# Patient Record
Sex: MALE | Race: WHITE | Employment: FULL TIME | URBAN - METROPOLITAN AREA
[De-identification: names, ages, dates, MRNs, and addresses within clinical notes are randomized per-mention and may not be internally consistent; named-entity substitution may affect disease eponyms.]

---

## 2024-05-03 ENCOUNTER — APPOINTMENT (OUTPATIENT)
Dept: GENERAL RADIOLOGY | Age: 24
DRG: 312 | End: 2024-05-03
Payer: COMMERCIAL

## 2024-05-03 ENCOUNTER — APPOINTMENT (OUTPATIENT)
Dept: CT IMAGING | Age: 24
DRG: 312 | End: 2024-05-03
Payer: COMMERCIAL

## 2024-05-03 ENCOUNTER — HOSPITAL ENCOUNTER (EMERGENCY)
Age: 24
Discharge: LEFT BEFORE DISCHARGE | DRG: 312 | End: 2024-05-04
Attending: EMERGENCY MEDICINE
Payer: COMMERCIAL

## 2024-05-03 VITALS
OXYGEN SATURATION: 100 % | SYSTOLIC BLOOD PRESSURE: 125 MMHG | DIASTOLIC BLOOD PRESSURE: 88 MMHG | BODY MASS INDEX: 27.3 KG/M2 | TEMPERATURE: 98.3 F | HEART RATE: 73 BPM | HEIGHT: 71 IN | WEIGHT: 195 LBS | RESPIRATION RATE: 23 BRPM

## 2024-05-03 DIAGNOSIS — R55 SYNCOPE AND COLLAPSE: Primary | ICD-10-CM

## 2024-05-03 DIAGNOSIS — I30.8 OTHER ACUTE PERICARDITIS: ICD-10-CM

## 2024-05-03 DIAGNOSIS — E87.6 HYPOKALEMIA: ICD-10-CM

## 2024-05-03 LAB
ANION GAP SERPL CALCULATED.3IONS-SCNC: 13 MMOL/L (ref 9–16)
BASOPHILS # BLD: 0.03 K/UL (ref 0–0.2)
BASOPHILS NFR BLD: 1 % (ref 0–2)
BNP SERPL-MCNC: <36 PG/ML (ref 0–300)
BUN SERPL-MCNC: 12 MG/DL (ref 6–20)
CALCIUM SERPL-MCNC: 8.1 MG/DL (ref 8.6–10.4)
CHLORIDE SERPL-SCNC: 104 MMOL/L (ref 98–107)
CO2 SERPL-SCNC: 22 MMOL/L (ref 20–31)
CREAT SERPL-MCNC: 1.3 MG/DL (ref 0.7–1.2)
CRP SERPL HS-MCNC: <3 MG/L (ref 0–5)
EOSINOPHIL # BLD: 0.19 K/UL (ref 0–0.44)
EOSINOPHILS RELATIVE PERCENT: 4 % (ref 1–4)
ERYTHROCYTE [DISTWIDTH] IN BLOOD BY AUTOMATED COUNT: 12.5 % (ref 11.8–14.4)
GFR, ESTIMATED: 79 ML/MIN/1.73M2
GLUCOSE SERPL-MCNC: 72 MG/DL (ref 74–99)
HCT VFR BLD AUTO: 40.1 % (ref 40.7–50.3)
HGB BLD-MCNC: 12.7 G/DL (ref 13–17)
IMM GRANULOCYTES # BLD AUTO: <0.03 K/UL (ref 0–0.3)
IMM GRANULOCYTES NFR BLD: 0 %
LYMPHOCYTES NFR BLD: 3.37 K/UL (ref 1.1–3.7)
LYMPHOCYTES RELATIVE PERCENT: 61 % (ref 24–43)
MAGNESIUM SERPL-MCNC: 2.1 MG/DL (ref 1.6–2.6)
MCH RBC QN AUTO: 27.6 PG (ref 25.2–33.5)
MCHC RBC AUTO-ENTMCNC: 31.7 G/DL (ref 28.4–34.8)
MCV RBC AUTO: 87.2 FL (ref 82.6–102.9)
MONOCYTES NFR BLD: 0.32 K/UL (ref 0.1–1.2)
MONOCYTES NFR BLD: 6 % (ref 3–12)
NEUTROPHILS NFR BLD: 28 % (ref 36–65)
NEUTS SEG NFR BLD: 1.53 K/UL (ref 1.5–8.1)
NRBC BLD-RTO: 0 PER 100 WBC
PLATELET # BLD AUTO: 211 K/UL (ref 138–453)
PMV BLD AUTO: 10.1 FL (ref 8.1–13.5)
POTASSIUM SERPL-SCNC: 3.5 MMOL/L (ref 3.7–5.3)
RBC # BLD AUTO: 4.6 M/UL (ref 4.21–5.77)
SODIUM SERPL-SCNC: 139 MMOL/L (ref 136–145)
TROPONIN I SERPL HS-MCNC: 20 NG/L (ref 0–22)
TROPONIN I SERPL HS-MCNC: 7 NG/L (ref 0–22)
WBC OTHER # BLD: 5.5 K/UL (ref 3.5–11.3)

## 2024-05-03 PROCEDURE — 85025 COMPLETE CBC W/AUTO DIFF WBC: CPT

## 2024-05-03 PROCEDURE — 93005 ELECTROCARDIOGRAM TRACING: CPT

## 2024-05-03 PROCEDURE — 70450 CT HEAD/BRAIN W/O DYE: CPT

## 2024-05-03 PROCEDURE — 85652 RBC SED RATE AUTOMATED: CPT

## 2024-05-03 PROCEDURE — 84484 ASSAY OF TROPONIN QUANT: CPT

## 2024-05-03 PROCEDURE — 83735 ASSAY OF MAGNESIUM: CPT

## 2024-05-03 PROCEDURE — 86140 C-REACTIVE PROTEIN: CPT

## 2024-05-03 PROCEDURE — 2580000003 HC RX 258

## 2024-05-03 PROCEDURE — 71046 X-RAY EXAM CHEST 2 VIEWS: CPT

## 2024-05-03 PROCEDURE — 72125 CT NECK SPINE W/O DYE: CPT

## 2024-05-03 PROCEDURE — 80048 BASIC METABOLIC PNL TOTAL CA: CPT

## 2024-05-03 PROCEDURE — 83880 ASSAY OF NATRIURETIC PEPTIDE: CPT

## 2024-05-03 PROCEDURE — 6370000000 HC RX 637 (ALT 250 FOR IP)

## 2024-05-03 RX ORDER — 0.9 % SODIUM CHLORIDE 0.9 %
1000 INTRAVENOUS SOLUTION INTRAVENOUS ONCE
Status: COMPLETED | OUTPATIENT
Start: 2024-05-03 | End: 2024-05-03

## 2024-05-03 RX ORDER — POTASSIUM CHLORIDE 20 MEQ/1
40 TABLET, EXTENDED RELEASE ORAL ONCE
Status: COMPLETED | OUTPATIENT
Start: 2024-05-03 | End: 2024-05-03

## 2024-05-03 RX ADMIN — SODIUM CHLORIDE 1000 ML: 9 INJECTION, SOLUTION INTRAVENOUS at 22:33

## 2024-05-03 RX ADMIN — POTASSIUM CHLORIDE 40 MEQ: 1500 TABLET, EXTENDED RELEASE ORAL at 22:32

## 2024-05-03 ASSESSMENT — LIFESTYLE VARIABLES
HOW OFTEN DO YOU HAVE A DRINK CONTAINING ALCOHOL: MONTHLY OR LESS
HOW MANY STANDARD DRINKS CONTAINING ALCOHOL DO YOU HAVE ON A TYPICAL DAY: 1 OR 2

## 2024-05-03 ASSESSMENT — PAIN - FUNCTIONAL ASSESSMENT: PAIN_FUNCTIONAL_ASSESSMENT: NONE - DENIES PAIN

## 2024-05-04 ENCOUNTER — APPOINTMENT (OUTPATIENT)
Dept: GENERAL RADIOLOGY | Age: 24
DRG: 312 | End: 2024-05-04
Payer: COMMERCIAL

## 2024-05-04 ENCOUNTER — HOSPITAL ENCOUNTER (INPATIENT)
Age: 24
LOS: 1 days | Discharge: HOME | DRG: 312 | End: 2024-05-05
Attending: EMERGENCY MEDICINE | Admitting: STUDENT IN AN ORGANIZED HEALTH CARE EDUCATION/TRAINING PROGRAM
Payer: COMMERCIAL

## 2024-05-04 ENCOUNTER — APPOINTMENT (OUTPATIENT)
Dept: CT IMAGING | Age: 24
DRG: 312 | End: 2024-05-04
Payer: COMMERCIAL

## 2024-05-04 DIAGNOSIS — R55 SYNCOPE AND COLLAPSE: Primary | ICD-10-CM

## 2024-05-04 DIAGNOSIS — R55 SYNCOPE, UNSPECIFIED SYNCOPE TYPE: ICD-10-CM

## 2024-05-04 LAB
ANION GAP SERPL CALCULATED.3IONS-SCNC: 13 MMOL/L (ref 9–16)
BASOPHILS # BLD: 0.03 K/UL (ref 0–0.2)
BASOPHILS NFR BLD: 1 % (ref 0–2)
BUN SERPL-MCNC: 12 MG/DL (ref 6–20)
CALCIUM SERPL-MCNC: 8.4 MG/DL (ref 8.6–10.4)
CHLORIDE SERPL-SCNC: 105 MMOL/L (ref 98–107)
CO2 SERPL-SCNC: 22 MMOL/L (ref 20–31)
CREAT SERPL-MCNC: 1.4 MG/DL (ref 0.7–1.2)
EKG ATRIAL RATE: 71 BPM
EKG P AXIS: 43 DEGREES
EKG P-R INTERVAL: 148 MS
EKG Q-T INTERVAL: 400 MS
EKG QRS DURATION: 92 MS
EKG QTC CALCULATION (BAZETT): 434 MS
EKG R AXIS: 54 DEGREES
EKG T AXIS: 42 DEGREES
EKG VENTRICULAR RATE: 71 BPM
EOSINOPHIL # BLD: 0.17 K/UL (ref 0–0.44)
EOSINOPHILS RELATIVE PERCENT: 4 % (ref 1–4)
ERYTHROCYTE [DISTWIDTH] IN BLOOD BY AUTOMATED COUNT: 12.5 % (ref 11.8–14.4)
ERYTHROCYTE [SEDIMENTATION RATE] IN BLOOD BY PHOTOMETRIC METHOD: 1 MM/HR (ref 0–15)
GFR, ESTIMATED: 71 ML/MIN/1.73M2
GLUCOSE SERPL-MCNC: 80 MG/DL (ref 74–99)
HGB BLD-MCNC: 12.8 G/DL (ref 13–17)
IMM GRANULOCYTES # BLD AUTO: <0.03 K/UL (ref 0–0.3)
IMM GRANULOCYTES NFR BLD: 0 %
LYMPHOCYTES NFR BLD: 2.52 K/UL (ref 1.1–3.7)
LYMPHOCYTES RELATIVE PERCENT: 62 % (ref 24–43)
MCH RBC QN AUTO: 26.8 PG (ref 25.2–33.5)
MCHC RBC AUTO-ENTMCNC: 30.9 G/DL (ref 28.4–34.8)
MCV RBC AUTO: 86.8 FL (ref 82.6–102.9)
MONOCYTES NFR BLD: 0.2 K/UL (ref 0.1–1.2)
MONOCYTES NFR BLD: 5 % (ref 3–12)
NEUTROPHILS NFR BLD: 28 % (ref 36–65)
NEUTS SEG NFR BLD: 1.13 K/UL (ref 1.5–8.1)
NRBC BLD-RTO: 0 PER 100 WBC
PLATELET # BLD AUTO: 231 K/UL (ref 138–453)
PMV BLD AUTO: 10.2 FL (ref 8.1–13.5)
POTASSIUM SERPL-SCNC: 4.1 MMOL/L (ref 3.7–5.3)
RBC # BLD AUTO: 4.77 M/UL (ref 4.21–5.77)
SODIUM SERPL-SCNC: 140 MMOL/L (ref 136–145)
TROPONIN I SERPL HS-MCNC: 10 NG/L (ref 0–22)
TROPONIN I SERPL HS-MCNC: 7 NG/L (ref 0–22)
WBC OTHER # BLD: 4.1 K/UL (ref 3.5–11.3)

## 2024-05-04 PROCEDURE — 85025 COMPLETE CBC W/AUTO DIFF WBC: CPT

## 2024-05-04 PROCEDURE — 72125 CT NECK SPINE W/O DYE: CPT

## 2024-05-04 PROCEDURE — 1200000000 HC SEMI PRIVATE

## 2024-05-04 PROCEDURE — 80048 BASIC METABOLIC PNL TOTAL CA: CPT

## 2024-05-04 PROCEDURE — 93010 ELECTROCARDIOGRAM REPORT: CPT | Performed by: INTERNAL MEDICINE

## 2024-05-04 PROCEDURE — 6370000000 HC RX 637 (ALT 250 FOR IP)

## 2024-05-04 PROCEDURE — 84484 ASSAY OF TROPONIN QUANT: CPT

## 2024-05-04 PROCEDURE — 71046 X-RAY EXAM CHEST 2 VIEWS: CPT

## 2024-05-04 PROCEDURE — 83880 ASSAY OF NATRIURETIC PEPTIDE: CPT

## 2024-05-04 PROCEDURE — 93005 ELECTROCARDIOGRAM TRACING: CPT | Performed by: STUDENT IN AN ORGANIZED HEALTH CARE EDUCATION/TRAINING PROGRAM

## 2024-05-04 PROCEDURE — 2580000003 HC RX 258

## 2024-05-04 PROCEDURE — 99285 EMERGENCY DEPT VISIT HI MDM: CPT

## 2024-05-04 PROCEDURE — 70450 CT HEAD/BRAIN W/O DYE: CPT

## 2024-05-04 RX ORDER — IBUPROFEN 400 MG/1
600 TABLET ORAL EVERY 8 HOURS PRN
Qty: 32 TABLET | Refills: 0 | Status: SHIPPED | OUTPATIENT
Start: 2024-05-04 | End: 2024-05-11

## 2024-05-04 RX ORDER — SODIUM CHLORIDE, SODIUM LACTATE, POTASSIUM CHLORIDE, AND CALCIUM CHLORIDE .6; .31; .03; .02 G/100ML; G/100ML; G/100ML; G/100ML
1000 INJECTION, SOLUTION INTRAVENOUS ONCE
Status: COMPLETED | OUTPATIENT
Start: 2024-05-04 | End: 2024-05-05

## 2024-05-04 RX ORDER — IBUPROFEN 400 MG/1
600 TABLET ORAL ONCE
Status: COMPLETED | OUTPATIENT
Start: 2024-05-04 | End: 2024-05-04

## 2024-05-04 RX ADMIN — IBUPROFEN 600 MG: 400 TABLET, FILM COATED ORAL at 01:01

## 2024-05-04 RX ADMIN — SODIUM CHLORIDE, POTASSIUM CHLORIDE, SODIUM LACTATE AND CALCIUM CHLORIDE 1000 ML: 600; 310; 30; 20 INJECTION, SOLUTION INTRAVENOUS at 23:46

## 2024-05-04 ASSESSMENT — PAIN SCALES - GENERAL: PAINLEVEL_OUTOF10: 1

## 2024-05-04 ASSESSMENT — PAIN - FUNCTIONAL ASSESSMENT: PAIN_FUNCTIONAL_ASSESSMENT: NONE - DENIES PAIN

## 2024-05-04 ASSESSMENT — LIFESTYLE VARIABLES
HOW OFTEN DO YOU HAVE A DRINK CONTAINING ALCOHOL: 2-4 TIMES A MONTH
HOW MANY STANDARD DRINKS CONTAINING ALCOHOL DO YOU HAVE ON A TYPICAL DAY: 3 OR 4

## 2024-05-04 NOTE — ED NOTES
Patient to ED via LS1 due to a syncopal episode happened 30 minutes PTA. Patient states he had the same episode when he was 14 years old. Denies any type of medical history. A&OX4, GCS 15 during assessment. Denies Chest pain or SOB at this time. IV established by LS1. EKG done. Patient is resting comfortably, NAD, VSS, Call light in reach. Plan of care on going.

## 2024-05-04 NOTE — ED NOTES
Orthostatic Vital signs:    Lying down:   Blood Pressure: 131/96mmHg (MAP: 106)  HR: 76 bpm  RR: 16 bpm  O2 saturation: 100% Room air    Sitting:  Blood Pressure: 150/103 mmHg MAP: 118)  HR: 74 bpm  RR: 17 bpm  O2 saturation: 100% Room air    Standing:  Blood Pressure 154/101 mmHg (MAP: 106)  HR: 68 bpm  RR: 18 bpm  O2 saturation: 99% Room air

## 2024-05-04 NOTE — ED PROVIDER NOTES
[facility] ED  Emergency Department Encounter  Emergency Medicine Resident     Pt Name:Eleuterio Duarte  MRN: [id number]  Birthdate 2000  Date of evaluation: 5/4/24  PCP:  No primary care provider on file.  Note Started: 2:54 AM EDT      CHIEF COMPLAINT       Chief Complaint   Patient presents with    Loss of Consciousness     Denies hitting the head but complaining of neck pain       HISTORY OF PRESENT ILLNESS  (Location/Symptom, Timing/Onset, Context/Setting, Quality, Duration, Modifying Factors, Severity.)      Eleuterio Duarte is a 23 y.o. male who presents with complaints of episode of loss of consciousness when he was at work after suddenly getting up and walking to back of the store.  Notes he had lightheadedness followed by loss consciousness.  Unsure if he hit his head.  Was not told he had any seizure-like activity no prior history of seizures.  Denies any regular alcohol but does note regular marijuana use.  Patient notes he had neck pain prior to fall that he woke up with in the morning after heavy workout.  Denies sudden onset neck pain yesterday.  No vision changes, numbness, weakness, tingling.  No chest pain or shortness of breath.  No recent cough, congestion, viral symptoms or fever.  Denies family history of sudden cardiac death at young age.  On arrival, patient in c-collar was placed by paramedics.  States he feels back to baseline.  Notes he has not been drinking much water over the last 24 hours.  Has had 1 episode of syncope in past when he was dehydrated as a teenager.  No regular medications.    PAST MEDICAL / SURGICAL / SOCIAL / FAMILY HISTORY      has no past medical history on file.  Reviewed with patient     has no past surgical history on file.  Reviewed with patient    Social History     Socioeconomic History    Marital status: Single     Spouse name: Not on file    Number of children: Not on file    Years of education: Not on file    Highest education level:

## 2024-05-04 NOTE — DISCHARGE INSTRUCTIONS
Thank you for visiting [facility] Emergency Department.    You need to call [facility] to make an appointment as directed for follow up.    Should you have any questions regarding your care or further treatment, please call [facility] Emergency Department at [phone].    You are leaving against medical advice. It was recommended that you were admitted today for further workup of possibly inflammation of the sac surrounding your heart.  You are at risk for possible abnormal heart rhythms that could kill you.  You should return to emergency department if you change your mind at any time about wanting further workup or management.  You should follow-up with soon as possible with cardiology as well as your family physician or [facility].  Drink plenty fluids and take your time when changing position.

## 2024-05-04 NOTE — ED PROVIDER NOTES
I performed a history and physical examination of the patient and discussed management with the resident. I reviewed the resident’s note and agree with the documented findings and plan of care. Any areas of disagreement are noted on the chart. I was personally present for the key portions of any procedures. I have documented in the chart those procedures where I was not present during the key portions. Unless noted in my documentation, I agree with the chief complaint, past medical history, past surgical history, allergies, medications, social and family history as documented. Documentation of the HPI, Physical Exam and Medical Decision Making performed by medical students or scribes is based on my personal performance of the HPI, PE and MDM.   For Phys Assistant/ Nurse Practitioner cases/documentation I have personally evaluated this patient and have completed at least one if not all key elements of the E/M (history, physical exam, and MDM). I find the patient's history and physical exam are consistent with the NP/PA documentation. I agree with the care provided, treatment rendered, disposition and followup plan.   Additional findings are as noted.    Mohamud Guzman MD  Attending Emergency  Physician        EKG Interpretation    Interpreted by me    Rhythm: normal sinus   Rate: 71  Axis: normal  Ectopy: none  Conduction: normal  ST Segments: ST elevations noted in leads II, aVF, V2, V3, V4, V5, V6.  T Waves: no acute change  Q Waves: none    Clinical Impression: Diffuse ST elevations and no previous tracings available for comparison.  Concern for possible pericarditis.        Mohamud Guzman MD  05/03/24 2101      This patient presented to the emergency department after having sustained an apparent syncopal episode tonight while at work.  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  He denies

## 2024-05-04 NOTE — ED PROVIDER NOTES
[facility] ED  Emergency Department  Faculty Sign-Out Addendum     Care of Eleuterio Duarte was assumed from previous attending and is being seen for Loss of Consciousness (Denies hitting the head but complaining of neck pain)  .  The patient's initial evaluation and plan have been discussed with the prior provider who initially evaluated the patient.      I reviewed the resident’s note and agree with the documented findings and plan of care. Any areas of disagreement are noted on the chart. I was personally present for the key portions of any procedures. I have documented in the chart those procedures where I was not present during the key portions. I have reviewed the emergency nurses triage note. I agree with the chief complaint, past medical history, past surgical history, allergies, medications, social and family history as documented unless otherwise noted below.      EMERGENCY DEPARTMENT COURSE / MEDICAL DECISION MAKING:       MEDICATIONS GIVEN:  Orders Placed This Encounter   Medications    sodium chloride 0.9 % bolus 1,000 mL    potassium chloride (KLOR-CON M) extended release tablet 40 mEq    ibuprofen (IBU) 400 MG tablet     Sig: Take 1.5 tablets by mouth every 8 hours as needed for Pain     Dispense:  32 tablet     Refill:  0    ibuprofen (ADVIL;MOTRIN) tablet 600 mg       LABS / RADIOLOGY:     Labs Reviewed   CBC WITH AUTO DIFFERENTIAL - Abnormal; Notable for the following components:       Result Value    Hemoglobin 12.7 (*)     Hematocrit 40.1 (*)     Neutrophils % 28 (*)     Lymphocytes % 61 (*)     All other components within normal limits   BASIC METABOLIC PANEL W/ REFLEX TO MG FOR LOW K - Abnormal; Notable for the following components:    Potassium 3.5 (*)     Glucose 72 (*)     Creatinine 1.3 (*)     Calcium 8.1 (*)     All other components within normal limits   TROPONIN   TROPONIN   BRAIN NATRIURETIC PEPTIDE   MAGNESIUM   C-REACTIVE PROTEIN   SEDIMENTATION RATE       XR  CHEST (2 VW)    Result Date: 5/3/2024  1. No acute intracranial abnormality. 2. No evidence of fracture or dislocation in the cervical spine. 3. Minimal degenerative disc disease at C5-C6. 4. No acute cardiopulmonary process.     CT HEAD WO CONTRAST    Result Date: 5/3/2024  1. No acute intracranial abnormality. 2. No evidence of fracture or dislocation in the cervical spine. 3. Minimal degenerative disc disease at C5-C6. 4. No acute cardiopulmonary process.     CT CERVICAL SPINE WO CONTRAST    Result Date: 5/3/2024  1. No acute intracranial abnormality. 2. No evidence of fracture or dislocation in the cervical spine. 3. Minimal degenerative disc disease at C5-C6. 4. No acute cardiopulmonary process.       RECENT VITALS:     Temp: 98.3 °F (36.8 °C),  Pulse: 73, Respirations: 23, BP: 125/88, SpO2: 100 %    This patient is a 23 y.o. Male with syncope.  EKG showed diffuse ST elevation concerning for pericarditis.  He has not had any fevers and did not have any friction rub on exam.  CT head and neck were unremarkable.  CRP and ESR are negative.  Troponins were 20 and then 7.   The rest of the ER workup was unremarkable.  Cardiology was consulted.    OUTSTANDING TASKS / RECOMMENDATIONS:    Cardiology recommends the patient be admitted for further workup and echo.  Patient refuses admission.  The patient will leave AGAINST MEDICAL ADVICE.  He was instructed to start NSAIDs and to have prompt follow-up with cardiology outpatient and to return to the ER for worsening symptoms or if he changes his mind about admission.      Anna Martinez DO  Attending Emergency Physician  [facility] ED        Anna Martinez DO  05/04/24 0113

## 2024-05-05 VITALS
HEART RATE: 62 BPM | DIASTOLIC BLOOD PRESSURE: 83 MMHG | RESPIRATION RATE: 18 BRPM | TEMPERATURE: 98 F | OXYGEN SATURATION: 100 % | SYSTOLIC BLOOD PRESSURE: 120 MMHG | HEIGHT: 71 IN | WEIGHT: 202.82 LBS | BODY MASS INDEX: 28.4 KG/M2

## 2024-05-05 PROBLEM — R79.89 ELEVATED TROPONIN: Status: ACTIVE | Noted: 2024-05-05

## 2024-05-05 PROBLEM — R94.31 ST ELEVATION: Status: ACTIVE | Noted: 2024-05-05

## 2024-05-05 PROBLEM — N17.9 AKI (ACUTE KIDNEY INJURY) (HCC): Status: ACTIVE | Noted: 2024-05-05

## 2024-05-05 PROBLEM — D64.9 ANEMIA: Status: ACTIVE | Noted: 2024-05-05

## 2024-05-05 LAB
AMPHET UR QL SCN: NEGATIVE
ANION GAP SERPL CALCULATED.3IONS-SCNC: 12 MMOL/L (ref 9–16)
BARBITURATES UR QL SCN: NEGATIVE
BASOPHILS # BLD: 0.03 K/UL (ref 0–0.2)
BASOPHILS NFR BLD: 1 % (ref 0–2)
BENZODIAZ UR QL: NEGATIVE
BILIRUB UR QL STRIP: NEGATIVE
BNP SERPL-MCNC: 45 PG/ML (ref 0–300)
BUN SERPL-MCNC: 11 MG/DL (ref 6–20)
CALCIUM SERPL-MCNC: 8.1 MG/DL (ref 8.6–10.4)
CANNABINOIDS UR QL SCN: POSITIVE
CHLORIDE SERPL-SCNC: 109 MMOL/L (ref 98–107)
CLARITY UR: CLEAR
CO2 SERPL-SCNC: 16 MMOL/L (ref 20–31)
COCAINE UR QL SCN: NEGATIVE
COLOR UR: YELLOW
COMMENT: NORMAL
CREAT SERPL-MCNC: 1.2 MG/DL (ref 0.7–1.2)
CREAT UR-MCNC: 212 MG/DL (ref 39–259)
EOSINOPHIL # BLD: 0.27 K/UL (ref 0–0.44)
EOSINOPHILS RELATIVE PERCENT: 6 % (ref 1–4)
ERYTHROCYTE [DISTWIDTH] IN BLOOD BY AUTOMATED COUNT: 12.5 % (ref 11.8–14.4)
FENTANYL UR QL: NEGATIVE
GFR, ESTIMATED: 89 ML/MIN/1.73M2
GLUCOSE SERPL-MCNC: 69 MG/DL (ref 74–99)
GLUCOSE UR STRIP-MCNC: NEGATIVE MG/DL
HCT VFR BLD AUTO: 38.5 % (ref 40.7–50.3)
HGB BLD-MCNC: 11.6 G/DL (ref 13–17)
HGB UR QL STRIP.AUTO: NEGATIVE
IMM GRANULOCYTES # BLD AUTO: <0.03 K/UL (ref 0–0.3)
IMM GRANULOCYTES NFR BLD: 0 %
KETONES UR STRIP-MCNC: NEGATIVE MG/DL
LEUKOCYTE ESTERASE UR QL STRIP: NEGATIVE
LYMPHOCYTES NFR BLD: 2.6 K/UL (ref 1.1–3.7)
LYMPHOCYTES RELATIVE PERCENT: 56 % (ref 24–43)
MAGNESIUM SERPL-MCNC: 2 MG/DL (ref 1.6–2.6)
MCH RBC QN AUTO: 26.9 PG (ref 25.2–33.5)
MCHC RBC AUTO-ENTMCNC: 30.1 G/DL (ref 28.4–34.8)
MCV RBC AUTO: 89.3 FL (ref 82.6–102.9)
METHADONE UR QL: NEGATIVE
MONOCYTES NFR BLD: 0.39 K/UL (ref 0.1–1.2)
MONOCYTES NFR BLD: 9 % (ref 3–12)
NEUTROPHILS NFR BLD: 28 % (ref 36–65)
NEUTS SEG NFR BLD: 1.26 K/UL (ref 1.5–8.1)
NITRITE UR QL STRIP: NEGATIVE
NRBC BLD-RTO: 0 PER 100 WBC
OPIATES UR QL SCN: NEGATIVE
OXYCODONE UR QL SCN: NEGATIVE
PCP UR QL SCN: NEGATIVE
PH UR STRIP: 5.5 [PH] (ref 5–8)
PLATELET # BLD AUTO: 187 K/UL (ref 138–453)
PMV BLD AUTO: 10 FL (ref 8.1–13.5)
POTASSIUM SERPL-SCNC: 4.7 MMOL/L (ref 3.7–5.3)
PROT UR STRIP-MCNC: NEGATIVE MG/DL
RBC # BLD AUTO: 4.31 M/UL (ref 4.21–5.77)
SODIUM SERPL-SCNC: 137 MMOL/L (ref 136–145)
SODIUM UR-SCNC: 204 MMOL/L
SP GR UR STRIP: 1.02 (ref 1–1.03)
TEST INFORMATION: ABNORMAL
TROPONIN I SERPL HS-MCNC: 8 NG/L (ref 0–22)
UROBILINOGEN UR STRIP-ACNC: NORMAL EU/DL (ref 0–1)
WBC OTHER # BLD: 4.6 K/UL (ref 3.5–11.3)

## 2024-05-05 PROCEDURE — 83735 ASSAY OF MAGNESIUM: CPT

## 2024-05-05 PROCEDURE — 36415 COLL VENOUS BLD VENIPUNCTURE: CPT

## 2024-05-05 PROCEDURE — 80307 DRUG TEST PRSMV CHEM ANLYZR: CPT

## 2024-05-05 PROCEDURE — 84484 ASSAY OF TROPONIN QUANT: CPT

## 2024-05-05 PROCEDURE — 99236 HOSP IP/OBS SAME DATE HI 85: CPT | Performed by: STUDENT IN AN ORGANIZED HEALTH CARE EDUCATION/TRAINING PROGRAM

## 2024-05-05 PROCEDURE — 2580000003 HC RX 258: Performed by: NURSE PRACTITIONER

## 2024-05-05 PROCEDURE — 81003 URINALYSIS AUTO W/O SCOPE: CPT

## 2024-05-05 PROCEDURE — 82570 ASSAY OF URINE CREATININE: CPT

## 2024-05-05 PROCEDURE — 85025 COMPLETE CBC W/AUTO DIFF WBC: CPT

## 2024-05-05 PROCEDURE — 93005 ELECTROCARDIOGRAM TRACING: CPT | Performed by: NURSE PRACTITIONER

## 2024-05-05 PROCEDURE — 80048 BASIC METABOLIC PNL TOTAL CA: CPT

## 2024-05-05 PROCEDURE — 84300 ASSAY OF URINE SODIUM: CPT

## 2024-05-05 RX ORDER — POTASSIUM CHLORIDE 7.45 MG/ML
10 INJECTION INTRAVENOUS PRN
Status: DISCONTINUED | OUTPATIENT
Start: 2024-05-05 | End: 2024-05-05 | Stop reason: HOSPADM

## 2024-05-05 RX ORDER — ACETAMINOPHEN 650 MG/1
650 SUPPOSITORY RECTAL EVERY 6 HOURS PRN
Status: DISCONTINUED | OUTPATIENT
Start: 2024-05-05 | End: 2024-05-05 | Stop reason: HOSPADM

## 2024-05-05 RX ORDER — MAGNESIUM SULFATE 1 G/100ML
1000 INJECTION INTRAVENOUS PRN
Status: DISCONTINUED | OUTPATIENT
Start: 2024-05-05 | End: 2024-05-05 | Stop reason: HOSPADM

## 2024-05-05 RX ORDER — SODIUM CHLORIDE 0.9 % (FLUSH) 0.9 %
10 SYRINGE (ML) INJECTION PRN
Status: DISCONTINUED | OUTPATIENT
Start: 2024-05-05 | End: 2024-05-05 | Stop reason: HOSPADM

## 2024-05-05 RX ORDER — ACETAMINOPHEN 325 MG/1
650 TABLET ORAL EVERY 6 HOURS PRN
Status: DISCONTINUED | OUTPATIENT
Start: 2024-05-05 | End: 2024-05-05 | Stop reason: HOSPADM

## 2024-05-05 RX ORDER — POLYETHYLENE GLYCOL 3350 17 G/17G
17 POWDER, FOR SOLUTION ORAL DAILY PRN
Status: DISCONTINUED | OUTPATIENT
Start: 2024-05-05 | End: 2024-05-05 | Stop reason: HOSPADM

## 2024-05-05 RX ORDER — ONDANSETRON 2 MG/ML
4 INJECTION INTRAMUSCULAR; INTRAVENOUS EVERY 6 HOURS PRN
Status: DISCONTINUED | OUTPATIENT
Start: 2024-05-05 | End: 2024-05-05 | Stop reason: HOSPADM

## 2024-05-05 RX ORDER — SODIUM CHLORIDE 9 MG/ML
INJECTION, SOLUTION INTRAVENOUS PRN
Status: DISCONTINUED | OUTPATIENT
Start: 2024-05-05 | End: 2024-05-05 | Stop reason: HOSPADM

## 2024-05-05 RX ORDER — SODIUM CHLORIDE 0.9 % (FLUSH) 0.9 %
5-40 SYRINGE (ML) INJECTION EVERY 12 HOURS SCHEDULED
Status: DISCONTINUED | OUTPATIENT
Start: 2024-05-05 | End: 2024-05-05 | Stop reason: HOSPADM

## 2024-05-05 RX ORDER — ONDANSETRON 4 MG/1
4 TABLET, ORALLY DISINTEGRATING ORAL EVERY 8 HOURS PRN
Status: DISCONTINUED | OUTPATIENT
Start: 2024-05-05 | End: 2024-05-05 | Stop reason: HOSPADM

## 2024-05-05 RX ORDER — POTASSIUM CHLORIDE 20 MEQ/1
40 TABLET, EXTENDED RELEASE ORAL PRN
Status: DISCONTINUED | OUTPATIENT
Start: 2024-05-05 | End: 2024-05-05 | Stop reason: HOSPADM

## 2024-05-05 RX ORDER — SODIUM CHLORIDE 9 MG/ML
INJECTION, SOLUTION INTRAVENOUS CONTINUOUS
Status: DISCONTINUED | OUTPATIENT
Start: 2024-05-05 | End: 2024-05-05

## 2024-05-05 RX ORDER — SODIUM CHLORIDE 9 MG/ML
INJECTION, SOLUTION INTRAVENOUS CONTINUOUS
Status: DISCONTINUED | OUTPATIENT
Start: 2024-05-05 | End: 2024-05-05 | Stop reason: HOSPADM

## 2024-05-05 RX ORDER — ENOXAPARIN SODIUM 100 MG/ML
40 INJECTION SUBCUTANEOUS DAILY
Status: DISCONTINUED | OUTPATIENT
Start: 2024-05-05 | End: 2024-05-05 | Stop reason: HOSPADM

## 2024-05-05 RX ADMIN — SODIUM CHLORIDE: 9 INJECTION, SOLUTION INTRAVENOUS at 00:45

## 2024-05-05 ASSESSMENT — SOCIAL DETERMINANTS OF HEALTH (SDOH)

## 2024-05-05 ASSESSMENT — PAIN SCALES - GENERAL
PAINLEVEL_OUTOF10: 0

## 2024-05-05 ASSESSMENT — PATIENT HEALTH QUESTIONNAIRE - PHQ9
2. FEELING DOWN, DEPRESSED OR HOPELESS: NOT AT ALL
SUM OF ALL RESPONSES TO PHQ9 QUESTIONS 1 & 2: 0
1. LITTLE INTEREST OR PLEASURE IN DOING THINGS: NOT AT ALL

## 2024-05-05 NOTE — PLAN OF CARE
Problem: Discharge Planning  Goal: Discharge to home or other facility with appropriate resources  5/5/2024 1116 by Madeleine Dawkins RN  Outcome: Completed  5/5/2024 0541 by Liliana Cramer  Outcome: Progressing     Problem: Safety - Adult  Goal: Free from fall injury  5/5/2024 1116 by Madeleine Dawkins, RN  Outcome: Completed  5/5/2024 0541 by Liliana Cramer  Outcome: Progressing

## 2024-05-05 NOTE — ED NOTES
Patient to ED via private auto for evaluation of syncopal episode. Patient was brought in yesterday by EMS for the same complaint and patient decided to leave and signed AMA form. Today patient was at the dollar store with his girlfriend when he had an episode of lightheaded and fell on his back and hit his head. Patient states he never get the chance to make an appointment with his heart doctor as he was instructed by the ER doctor last night upon signing the AMA form. Patient's GCS is 15 at this time. NIH score is 0 per Dr. Nix. EKG done, IV established. Patient is resting comfortably, NAD, VSS, Call light in reach. Plan of care on going.

## 2024-05-05 NOTE — CARE COORDINATION
Case Management Assessment  Initial Evaluation    Date/Time of Evaluation: 5/5/2024 10:57 AM  Assessment Completed by: Carlota Narayan RN    If patient is discharged prior to next notation, then this note serves as note for discharge by case management.    Patient Name: Eleuterio Duarte                   YOB: 2000  Diagnosis: Syncope and collapse [R55]                   Date / Time: 5/4/2024  8:57 PM    Patient Admission Status: Inpatient   Readmission Risk (Low < 19, Mod (19-27), High > 27): Readmission Risk Score: 6.8    Current PCP: No primary care provider on file.  PCP verified by CM? (P) Yes (is in process of choosing pcp from ins)    Chart Reviewed: Yes      History Provided by: (P) Patient  Patient Orientation: (P) Alert and Oriented    Patient Cognition: (P) Alert    Hospitalization in the last 30 days (Readmission):  No    If yes, Readmission Assessment in CM Navigator will be completed.    Advance Directives:      Code Status: Full Code   Patient's Primary Decision Maker is:        Discharge Planning:    Patient lives with: (P) Alone Type of Home: (P) Apartment  Primary Care Giver: (P) Self  Patient Support Systems include: (P) Family Members, Spouse/Significant Other   Current Financial resources:    Current community resources:    Current services prior to admission: (P) None            Current DME:              Type of Home Care services:  (P) None    ADLS  Prior functional level: (P) Independent in ADLs/IADLs  Current functional level: (P) Independent in ADLs/IADLs    PT AM-PAC:   /24  OT AM-PAC:   /24    Family can provide assistance at DC:    Would you like Case Management to discuss the discharge plan with any other family members/significant others, and if so, who?    Plans to Return to Present Housing: (P) Yes  Other Identified Issues/Barriers to RETURNING to current housing: none  Potential Assistance needed at discharge: (P) N/A            Potential DME:    Patient expects to

## 2024-05-05 NOTE — ED PROVIDER NOTES
[facility]  Emergency Department Encounter  Emergency Medicine Resident     Pt Name:Eleuterio Duarte  MRN: [id number]  Birthdate 2000  Date of evaluation: 5/5/24  PCP:  No primary care provider on file.  Note Started: 2:25 AM EDT      CHIEF COMPLAINT       Chief Complaint   Patient presents with    Loss of Consciousness     Passed out, +LOC, Less than a minute. Fall back and hit his head with a soda case       HISTORY OF PRESENT ILLNESS  (Location/Symptom, Timing/Onset, Context/Setting, Quality, Duration, Modifying Factors, Severity.)      Eleuterio Duarte is a 23 y.o. male who presents with after syncopal episode while in the [employer].  Patient states he was walking around felt slightly dizzy and lightheaded.  Symptoms initially improved and then returned shortly after resulting in him losing consciousness and blacking out.  As per girlfriend who witnessed the syncopal episode he hit his head on a case of soda fell to the ground.  No witnessed shaking of the extremities or seizure-like activity.  No bite tongue bleeding or loss of bowel or bladder control.  Patient came to room initially doing a little bit confused however quickly became oriented.  Patient denies any chest pain, shortness of breath or palpitations prior to event.  Had a similar episode yesterday however at the time he was at work and standing up from a seated position.  He was seen in the emergency room with concerns for pericarditis at the time with recommendations to be admitted and have an echocardiogram done and to be assessed by cardiology.  Patient left AGAINST MEDICAL ADVICE and was discharged with ibuprofen.  States he is not taking any ibuprofen since discharge.  No recent viral illnesses, no chest pain, shortness of breath, abdominal pain, nausea or vomiting.  No significant family history of sudden cardiac death.  States his grandfather had a heart attack in his late 50s and his father passed away of

## 2024-05-05 NOTE — ED NOTES
ED to inpatient nurses report      Chief Complaint:  Chief Complaint   Patient presents with    Loss of Consciousness     Passed out, +LOC, Less than a minute. Fall back and hit his head with a soda case     Present to ED from: Home    MOA:     LOC: alert and orientated to name, place, date  Mobility: Independent  Oxygen Baseline: RA    Current needs required: none   Pending ED orders: none  Present condition: stable VS    Why did the patient come to the ED? syncopal episode/ loss of consciousness  What is the plan? Admit; Cardiology consult  Any procedures or intervention occur? Labs, X-ray, CT SCan  Any safety concerns??    Mental Status:  Level of Consciousness: Alert (0)    Psych Assessment:   Psychosocial  Psychosocial (WDL): Within Defined Limits  Vital signs   Vitals:    05/04/24 2338 05/04/24 2341 05/04/24 2343 05/04/24 2345   BP: (!) 108/54 (!) 118/46 117/64    Pulse: 78 75 91 76   Resp: 24 17 23 24   Temp:       TempSrc:       SpO2: 97% 98% 95% 96%        Vitals:  Patient Vitals for the past 24 hrs:   BP Temp Temp src Pulse Resp SpO2   05/04/24 2345 -- -- -- 76 24 96 %   05/04/24 2343 117/64 -- -- 91 23 95 %   05/04/24 2341 (!) 118/46 -- -- 75 17 98 %   05/04/24 2338 (!) 108/54 -- -- 78 24 97 %   05/04/24 2318 -- -- -- 84 16 98 %   05/04/24 2315 (!) 110/39 -- -- 80 15 98 %   05/04/24 2307 -- -- -- 73 16 98 %   05/04/24 2300 (!) 118/54 -- -- 86 18 98 %   05/04/24 2215 -- -- -- 77 26 97 %   05/04/24 2202 (!) 109/54 -- -- 84 13 97 %   05/04/24 2157 (!) 117/54 -- -- -- -- --   05/04/24 2111 -- -- -- 79 18 98 %   05/04/24 2109 -- 98.7 °F (37.1 °C) Oral -- -- --   05/04/24 2106 -- -- -- 84 15 97 %   05/04/24 2101 123/73 -- -- 80 18 99 %      Visit Vitals  /64   Pulse 76   Temp 98.7 °F (37.1 °C) (Oral)   Resp 24   SpO2 96%        LDAs:   Peripheral IV 05/04/24 Right Antecubital (Active)   Site Assessment Clean, dry & intact 05/04/24 2998   Line Status Blood return noted;Infusing;Specimen collected 05/04/24

## 2024-05-05 NOTE — FLOWSHEET NOTE
05/05/24 1120   AVS Reviewed   AVS & discharge instructions reviewed with patient and/or representative? Yes   Reviewed instructions with Patient   Level of Understanding Questions answered;Verbalized understanding

## 2024-05-05 NOTE — ED PROVIDER NOTES
[facility]     Emergency Department     Faculty Attestation    I performed a history and physical examination of the patient and discussed management with the resident. I reviewed the resident´s note and agree with the documented findings and plan of care. Any areas of disagreement are noted on the chart. I was personally present for the key portions of any procedures. I have documented in the chart those procedures where I was not present during the key portions. I have reviewed the emergency nurses triage note. I agree with the chief complaint, past medical history, past surgical history, allergies, medications, social and family history as documented unless otherwise noted below. For Physician Assistant/ Nurse Practitioner cases/documentation I have personally evaluated this patient and have completed at least one if not all key elements of the E/M (history, physical exam, and MDM). Additional findings are as noted.    Chest clear,  Heart exam normal , no pain or swelling on examination of the lower extremities , equal pulses both wrists , trachea midline.  Abdomen is nontender without pulsatile mass or bruit.  Patient denies chest pain or shortness of breath.  Patient had a syncopal episode and hit his head while he was falling.  Patient denies headache or neck pain.  Patient had a syncopal episode yesterday and was discharged home.. Patient appears comfortable in no distress, skin is warm and dry.    Ben Archer MD FACEP  Attending Physician       EKG Interpretation    Interpreted by emergency department physician    Rhythm: normal sinus   Rate: normal/79  Axis: normal 64  Ectopy: none  Conduction: normal  Diffuse ST and T wave changes  Q Waves: none    Clinical Impression: Abnormal EKG without significant changes from EKG done yesterday.    Ben Archer, III     Ben Archer MD  05/04/24 2123

## 2024-05-05 NOTE — ED NOTES
Orthostatic Vital signs:    Lying Down:  /54 mmHg  HR 80 bpm  RR 25 bpm  O2 97% room air    Sitting:  /46 mmHg  HR 79 bpm  RR 25 bpm  O2 98% room air    Standing:  /64 mmHg  HR 84 bpm  RR 17 bpm  O2 96% room air

## 2024-05-05 NOTE — H&P
[facility]  Office: [phone]  Johnie Chu DO, Geovanny Vargas DO, Dalton Sterling DO, Robi Rodriguez DO, Isidoro Sheridan MD, Caro Baumann MD, Indira Crystal MD, Kenia Villegas MD,  Kaushal Whitfield MD, Denis Patel MD, Juventino Morales DO, Kalani Jordan MD,  Geeta Aguilar DO, David Dias MD, Derrick Urena MD, Mohamud Chu DO, Maude Tong MD, Sammy Alejo MD, Jeffry Barlow DO, Miroslava Denney MD, Alla Augustine MD, Constance Albright MD, Jaquan Viveros MD,  Nithin Laguerre DO, Hammad Stuart MD,  Shirley Waterhouse, CNP,  Shyann Mcdermott, CNP, Kim Hernandez, CNP, Emmanuel Chao, CNP,  Valentina Martinez, [facility], Aleksandra Doherty, CNP, Tara Munoz, CNP, Lore Young, CNP, Serenity Knott, CNP, Isabel Toledo, CNP, Musa Argueta PA-C, Ann Montague, CNS, Ann Rod, CNP, Goldie Teixeira, CNP         [facility]   IN-PATIENT SERVICE   [facility]    HISTORY AND PHYSICAL EXAMINATION            Date:   5/5/2024  Patient name:  Eleuterio Duarte  Date of admission:  5/4/2024  8:57 PM  MRN:   [id number]  Account:  [id number]  YOB: 2000  PCP:    No primary care provider on file.  Room:   [address] [place] [address]  Code Status:    Full Code    Chief Complaint:     Chief Complaint   Patient presents with    Loss of Consciousness     Passed out, +LOC, Less than a minute. Fall back and hit his head with a soda case       History Obtained From:     patient    History of Present Illness:     Eleuterio Duarte is a 23 y.o. Non- / non  male who presents with Loss of Consciousness (Passed out, +LOC, Less than a minute. Fall back and hit his head with a soda case)   and is admitted to the hospital for the management of Syncope and collapse.    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  Patient initially came to ED on 5/3 after syncopal episode at work.  ED workup showed abnormal EKG with ST segment elevations.  Bedside

## 2024-05-05 NOTE — DISCHARGE SUMMARY
VW)    Result Date: 5/4/2024  1. No acute intracranial abnormality. 2. No evidence of fracture or dislocation in the cervical spine. 3. Minimal degenerative disc disease at C5-C6. 4. No acute cardiopulmonary process.     CT HEAD WO CONTRAST    Result Date: 5/4/2024  1. No acute intracranial abnormality. 2. No evidence of fracture or dislocation in the cervical spine. 3. Minimal degenerative disc disease at C5-C6. 4. No acute cardiopulmonary process.     CT CERVICAL SPINE WO CONTRAST    Result Date: 5/4/2024  1. No acute intracranial abnormality. 2. No evidence of fracture or dislocation in the cervical spine. 3. Minimal degenerative disc disease at C5-C6. 4. No acute cardiopulmonary process.       Consultations:    Consults:     Final Specialist Recommendations/Findings:   IP CONSULT TO CARDIOLOGY  IP CONSULT TO HOSPITALIST  IP CONSULT TO SOCIAL WORK      The patient was seen and examined on day of discharge and this discharge summary is in conjunction with any daily progress note from day of discharge.    Discharge plan:     Disposition: AMA    Physician Follow Up:   PCP in 1-2 weeks     Requiring Further Evaluation/Follow Up POST HOSPITALIZATION/Incidental Findings:     Diet: regular diet and cardiac diet    Activity: As tolerated    Instructions to Patient:   - Take all medications as prescribed  - Follow up with PCP in 1-2 weeks   - In case of any worsening condition please visit Emergency Room.      Discharge Medications:      Medication List        CONTINUE taking these medications      ibuprofen 400 MG tablet  Commonly known as: IBU  Take 1.5 tablets by mouth every 8 hours as needed for Pain              Discharge Procedure Orders   Echo (TTE) complete (PRN contrast/bubble/strain/3D)   Standing Status: Future Standing Exp. Date: 05/05/25     Order Specific Question Answer Comments   Contrast Enhancement (Bubble Study, Definity, Optison) may be used if criteria listed in established evidence-based protocol has

## 2024-05-05 NOTE — CONSULTS
[facility]    Consult / H&P               Today's Date: 5/5/2024  Patient Name: Eleuterio Duarte  Date of admission: 5/4/2024  8:57 PM  Patient's age: 23 y.o., 2000  Admission Dx: Syncope and collapse [R55]    Requesting Physician: Nurys Hallman MD    Cardiac Evaluation Reason:  Syncopal episode that happened again today.  Left AMA yesterday.  Loss of consciousness was for less than a minute, fell back and hit his head on a soda case.  History Obtained From: patient and chart review     History of Present Illness:    This patient 23 y.o. years old with past medical history given below.  Readmitted on 5/5 for a syncopal episode with loss of consciousness less than 1 minute after previously being admitted on 5/3 and leaving 5/4, for a syncopal episode.  This happened when he was at the [employer] with his girlfriend who did not witness any seizures at the time.  In the ER EKG showed ST segment elevation.  He had had similar episode in the past without loss of consciousness.    CT head and neck were negative after his fall.  Orthostatics were negative.    Past Medical History:   has no past medical history on file.    Past Surgical History:   has no past surgical history on file.     Home Medications:    Prior to Admission medications    Medication Sig Start Date End Date Taking? Authorizing Provider   ibuprofen (IBU) 400 MG tablet Take 1.5 tablets by mouth every 8 hours as needed for Pain 5/4/24 5/11/24  Anel Glasgow MD       Allergies:  Patient has no known allergies.    Social History:   reports that he has never smoked. He has never used smokeless tobacco. He reports that he does not currently use alcohol. He reports current drug use. Drug: Marijuana (Weed).     Family History: family history is not on file.     REVIEW OF SYSTEMS:    Constitutional: Negative for fatigue, weight loss, loss of appetite   Cardiovascular: as per HPI  Respiratory: as per HPI  Gastrointestinal: Negative

## 2024-05-07 LAB
EKG ATRIAL RATE: 66 BPM
EKG ATRIAL RATE: 79 BPM
EKG P AXIS: 40 DEGREES
EKG P AXIS: 49 DEGREES
EKG P-R INTERVAL: 144 MS
EKG P-R INTERVAL: 154 MS
EKG Q-T INTERVAL: 366 MS
EKG Q-T INTERVAL: 390 MS
EKG QRS DURATION: 82 MS
EKG QRS DURATION: 82 MS
EKG QTC CALCULATION (BAZETT): 408 MS
EKG QTC CALCULATION (BAZETT): 419 MS
EKG R AXIS: 64 DEGREES
EKG R AXIS: 73 DEGREES
EKG T AXIS: 42 DEGREES
EKG T AXIS: 44 DEGREES
EKG VENTRICULAR RATE: 66 BPM
EKG VENTRICULAR RATE: 79 BPM

## 2024-05-07 PROCEDURE — 93010 ELECTROCARDIOGRAM REPORT: CPT | Performed by: INTERNAL MEDICINE

## 2024-05-09 ENCOUNTER — HOSPITAL ENCOUNTER (OUTPATIENT)
Age: 24
Discharge: HOME | End: 2024-05-11
Attending: STUDENT IN AN ORGANIZED HEALTH CARE EDUCATION/TRAINING PROGRAM
Payer: COMMERCIAL

## 2024-05-09 DIAGNOSIS — R55 SYNCOPE, UNSPECIFIED SYNCOPE TYPE: ICD-10-CM

## 2024-05-09 LAB
ECHO AO ASC DIAM: 3 CM
ECHO AO ROOT DIAM: 3.6 CM
ECHO AV AREA PEAK VELOCITY: 3.1 CM2
ECHO AV AREA VTI: 2.9 CM2
ECHO AV MEAN GRADIENT: 3 MMHG
ECHO AV MEAN VELOCITY: 0.8 M/S
ECHO AV PEAK GRADIENT: 6 MMHG
ECHO AV PEAK VELOCITY: 1.2 M/S
ECHO AV VELOCITY RATIO: 0.83
ECHO AV VTI: 22.8 CM
ECHO EST RA PRESSURE: 3 MMHG
ECHO IVC PROX: 1.9 CM
ECHO LA AREA 2C: 18.9 CM2
ECHO LA AREA 4C: 16.1 CM2
ECHO LA DIAMETER: 3.6 CM
ECHO LA MAJOR AXIS: 4.5 CM
ECHO LA MINOR AXIS: 4.9 CM
ECHO LA TO AORTIC ROOT RATIO: 1
ECHO LA VOL BP: 53 ML (ref 18–58)
ECHO LA VOL MOD A2C: 59 ML (ref 18–58)
ECHO LA VOL MOD A4C: 43 ML (ref 18–58)
ECHO LV E' LATERAL VELOCITY: 20 CM/S
ECHO LV E' SEPTAL VELOCITY: 12 CM/S
ECHO LV EDV A2C: 131 ML
ECHO LV EDV A4C: 125 ML
ECHO LV EJECTION FRACTION A2C: 62 %
ECHO LV EJECTION FRACTION A4C: 57 %
ECHO LV EJECTION FRACTION BIPLANE: 60 % (ref 55–100)
ECHO LV ESV A2C: 50 ML
ECHO LV ESV A4C: 54 ML
ECHO LV FRACTIONAL SHORTENING: 30 % (ref 28–44)
ECHO LV INTERNAL DIMENSION DIASTOLIC: 5.4 CM (ref 4.2–5.9)
ECHO LV INTERNAL DIMENSION SYSTOLIC: 3.8 CM
ECHO LV IVSD: 1.3 CM (ref 0.6–1)
ECHO LV MASS 2D: 295.6 G (ref 88–224)
ECHO LV POSTERIOR WALL DIASTOLIC: 1.3 CM (ref 0.6–1)
ECHO LV RELATIVE WALL THICKNESS RATIO: 0.48
ECHO LVOT AREA: 3.8 CM2
ECHO LVOT AV VTI INDEX: 0.77
ECHO LVOT DIAM: 2.2 CM
ECHO LVOT MEAN GRADIENT: 1 MMHG
ECHO LVOT PEAK GRADIENT: 4 MMHG
ECHO LVOT PEAK VELOCITY: 1 M/S
ECHO LVOT SV: 66.9 ML
ECHO LVOT VTI: 17.6 CM
ECHO MV A VELOCITY: 0.4 M/S
ECHO MV AREA VTI: 2.7 CM2
ECHO MV E DECELERATION TIME (DT): 179 MS
ECHO MV E VELOCITY: 0.68 M/S
ECHO MV E/A RATIO: 1.7
ECHO MV E/E' LATERAL: 3.4
ECHO MV E/E' RATIO (AVERAGED): 4.53
ECHO MV E/E' SEPTAL: 5.67
ECHO MV LVOT VTI INDEX: 1.41
ECHO MV MAX VELOCITY: 0.8 M/S
ECHO MV MEAN GRADIENT: 1 MMHG
ECHO MV MEAN VELOCITY: 0.5 M/S
ECHO MV PEAK GRADIENT: 3 MMHG
ECHO MV VTI: 24.8 CM
ECHO RIGHT VENTRICULAR SYSTOLIC PRESSURE (RVSP): 14 MMHG
ECHO RV FREE WALL PEAK S': 16 CM/S
ECHO RV TAPSE: 3.2 CM (ref 1.7–?)
ECHO TV REGURGITANT MAX VELOCITY: 1.63 M/S
ECHO TV REGURGITANT PEAK GRADIENT: 11 MMHG

## 2024-05-09 PROCEDURE — 93306 TTE W/DOPPLER COMPLETE: CPT

## 2024-05-09 PROCEDURE — 93306 TTE W/DOPPLER COMPLETE: CPT | Performed by: INTERNAL MEDICINE
